# Patient Record
Sex: FEMALE | Race: WHITE | URBAN - METROPOLITAN AREA
[De-identification: names, ages, dates, MRNs, and addresses within clinical notes are randomized per-mention and may not be internally consistent; named-entity substitution may affect disease eponyms.]

---

## 2023-03-15 ENCOUNTER — INPATIENT (INPATIENT)
Facility: HOSPITAL | Age: 58
LOS: 0 days | Discharge: HOME | DRG: 149 | End: 2023-03-16
Attending: STUDENT IN AN ORGANIZED HEALTH CARE EDUCATION/TRAINING PROGRAM | Admitting: FAMILY MEDICINE
Payer: COMMERCIAL

## 2023-03-15 VITALS — HEIGHT: 62 IN | WEIGHT: 141.1 LBS

## 2023-03-15 DIAGNOSIS — Z98.890 OTHER SPECIFIED POSTPROCEDURAL STATES: Chronic | ICD-10-CM

## 2023-03-15 DIAGNOSIS — Z90.49 ACQUIRED ABSENCE OF OTHER SPECIFIED PARTS OF DIGESTIVE TRACT: Chronic | ICD-10-CM

## 2023-03-15 DIAGNOSIS — Z98.891 HISTORY OF UTERINE SCAR FROM PREVIOUS SURGERY: Chronic | ICD-10-CM

## 2023-03-15 DIAGNOSIS — R42 DIZZINESS AND GIDDINESS: ICD-10-CM

## 2023-03-15 LAB
ALBUMIN SERPL ELPH-MCNC: 4.3 G/DL — SIGNIFICANT CHANGE UP (ref 3.3–5)
ALP SERPL-CCNC: 49 U/L — SIGNIFICANT CHANGE UP (ref 40–120)
ALT FLD-CCNC: 22 U/L — SIGNIFICANT CHANGE UP (ref 12–78)
ANION GAP SERPL CALC-SCNC: 4 MMOL/L — LOW (ref 5–17)
APPEARANCE UR: CLEAR — SIGNIFICANT CHANGE UP
APTT BLD: 23.6 SEC — LOW (ref 27.5–35.5)
AST SERPL-CCNC: 19 U/L — SIGNIFICANT CHANGE UP (ref 15–37)
BACTERIA # UR AUTO: NEGATIVE — SIGNIFICANT CHANGE UP
BASOPHILS # BLD AUTO: 0.05 K/UL — SIGNIFICANT CHANGE UP (ref 0–0.2)
BASOPHILS NFR BLD AUTO: 0.5 % — SIGNIFICANT CHANGE UP (ref 0–2)
BILIRUB SERPL-MCNC: 0.4 MG/DL — SIGNIFICANT CHANGE UP (ref 0.2–1.2)
BILIRUB UR-MCNC: NEGATIVE — SIGNIFICANT CHANGE UP
BUN SERPL-MCNC: 9 MG/DL — SIGNIFICANT CHANGE UP (ref 7–23)
CALCIUM SERPL-MCNC: 9.4 MG/DL — SIGNIFICANT CHANGE UP (ref 8.5–10.1)
CHLORIDE SERPL-SCNC: 107 MMOL/L — SIGNIFICANT CHANGE UP (ref 96–108)
CO2 SERPL-SCNC: 28 MMOL/L — SIGNIFICANT CHANGE UP (ref 22–31)
COLOR SPEC: YELLOW — SIGNIFICANT CHANGE UP
CREAT SERPL-MCNC: 0.7 MG/DL — SIGNIFICANT CHANGE UP (ref 0.5–1.3)
DIFF PNL FLD: ABNORMAL
EGFR: 101 ML/MIN/1.73M2 — SIGNIFICANT CHANGE UP
EOSINOPHIL # BLD AUTO: 0.32 K/UL — SIGNIFICANT CHANGE UP (ref 0–0.5)
EOSINOPHIL NFR BLD AUTO: 3.3 % — SIGNIFICANT CHANGE UP (ref 0–6)
EPI CELLS # UR: SIGNIFICANT CHANGE UP
FLUAV AG NPH QL: SIGNIFICANT CHANGE UP
FLUBV AG NPH QL: SIGNIFICANT CHANGE UP
GLUCOSE SERPL-MCNC: 100 MG/DL — HIGH (ref 70–99)
GLUCOSE UR QL: NEGATIVE — SIGNIFICANT CHANGE UP
HCT VFR BLD CALC: 39.7 % — SIGNIFICANT CHANGE UP (ref 34.5–45)
HGB BLD-MCNC: 13 G/DL — SIGNIFICANT CHANGE UP (ref 11.5–15.5)
IMM GRANULOCYTES NFR BLD AUTO: 0.3 % — SIGNIFICANT CHANGE UP (ref 0–0.9)
INR BLD: 1.15 RATIO — SIGNIFICANT CHANGE UP (ref 0.88–1.16)
KETONES UR-MCNC: NEGATIVE — SIGNIFICANT CHANGE UP
LEUKOCYTE ESTERASE UR-ACNC: ABNORMAL
LYMPHOCYTES # BLD AUTO: 2.71 K/UL — SIGNIFICANT CHANGE UP (ref 1–3.3)
LYMPHOCYTES # BLD AUTO: 27.9 % — SIGNIFICANT CHANGE UP (ref 13–44)
MCHC RBC-ENTMCNC: 28.3 PG — SIGNIFICANT CHANGE UP (ref 27–34)
MCHC RBC-ENTMCNC: 32.7 GM/DL — SIGNIFICANT CHANGE UP (ref 32–36)
MCV RBC AUTO: 86.5 FL — SIGNIFICANT CHANGE UP (ref 80–100)
MONOCYTES # BLD AUTO: 0.57 K/UL — SIGNIFICANT CHANGE UP (ref 0–0.9)
MONOCYTES NFR BLD AUTO: 5.9 % — SIGNIFICANT CHANGE UP (ref 2–14)
NEUTROPHILS # BLD AUTO: 6.03 K/UL — SIGNIFICANT CHANGE UP (ref 1.8–7.4)
NEUTROPHILS NFR BLD AUTO: 62.1 % — SIGNIFICANT CHANGE UP (ref 43–77)
NITRITE UR-MCNC: NEGATIVE — SIGNIFICANT CHANGE UP
PH UR: 8 — SIGNIFICANT CHANGE UP (ref 5–8)
PLATELET # BLD AUTO: 367 K/UL — SIGNIFICANT CHANGE UP (ref 150–400)
POTASSIUM SERPL-MCNC: 4.2 MMOL/L — SIGNIFICANT CHANGE UP (ref 3.5–5.3)
POTASSIUM SERPL-SCNC: 4.2 MMOL/L — SIGNIFICANT CHANGE UP (ref 3.5–5.3)
PROT SERPL-MCNC: 8 GM/DL — SIGNIFICANT CHANGE UP (ref 6–8.3)
PROT UR-MCNC: NEGATIVE — SIGNIFICANT CHANGE UP
PROTHROM AB SERPL-ACNC: 13.4 SEC — SIGNIFICANT CHANGE UP (ref 10.5–13.4)
RBC # BLD: 4.59 M/UL — SIGNIFICANT CHANGE UP (ref 3.8–5.2)
RBC # FLD: 12.5 % — SIGNIFICANT CHANGE UP (ref 10.3–14.5)
RBC CASTS # UR COMP ASSIST: SIGNIFICANT CHANGE UP /HPF (ref 0–4)
RSV RNA NPH QL NAA+NON-PROBE: SIGNIFICANT CHANGE UP
SARS-COV-2 RNA SPEC QL NAA+PROBE: SIGNIFICANT CHANGE UP
SODIUM SERPL-SCNC: 139 MMOL/L — SIGNIFICANT CHANGE UP (ref 135–145)
SP GR SPEC: 1.01 — SIGNIFICANT CHANGE UP (ref 1.01–1.02)
TROPONIN I, HIGH SENSITIVITY RESULT: 4.31 NG/L — SIGNIFICANT CHANGE UP
UROBILINOGEN FLD QL: NEGATIVE — SIGNIFICANT CHANGE UP
WBC # BLD: 9.71 K/UL — SIGNIFICANT CHANGE UP (ref 3.8–10.5)
WBC # FLD AUTO: 9.71 K/UL — SIGNIFICANT CHANGE UP (ref 3.8–10.5)
WBC UR QL: SIGNIFICANT CHANGE UP /HPF (ref 0–5)

## 2023-03-15 PROCEDURE — 83735 ASSAY OF MAGNESIUM: CPT

## 2023-03-15 PROCEDURE — 36415 COLL VENOUS BLD VENIPUNCTURE: CPT

## 2023-03-15 PROCEDURE — 70553 MRI BRAIN STEM W/O & W/DYE: CPT

## 2023-03-15 PROCEDURE — 82607 VITAMIN B-12: CPT

## 2023-03-15 PROCEDURE — 84443 ASSAY THYROID STIM HORMONE: CPT

## 2023-03-15 PROCEDURE — 83036 HEMOGLOBIN GLYCOSYLATED A1C: CPT

## 2023-03-15 PROCEDURE — 93010 ELECTROCARDIOGRAM REPORT: CPT

## 2023-03-15 PROCEDURE — A9579: CPT

## 2023-03-15 PROCEDURE — 70496 CT ANGIOGRAPHY HEAD: CPT | Mod: 26,MA

## 2023-03-15 PROCEDURE — 70498 CT ANGIOGRAPHY NECK: CPT | Mod: 26,MA

## 2023-03-15 PROCEDURE — 80061 LIPID PANEL: CPT

## 2023-03-15 PROCEDURE — 70551 MRI BRAIN STEM W/O DYE: CPT

## 2023-03-15 PROCEDURE — 99285 EMERGENCY DEPT VISIT HI MDM: CPT

## 2023-03-15 PROCEDURE — 85025 COMPLETE CBC W/AUTO DIFF WBC: CPT

## 2023-03-15 PROCEDURE — 84100 ASSAY OF PHOSPHORUS: CPT

## 2023-03-15 PROCEDURE — 99222 1ST HOSP IP/OBS MODERATE 55: CPT

## 2023-03-15 PROCEDURE — 82746 ASSAY OF FOLIC ACID SERUM: CPT

## 2023-03-15 PROCEDURE — G0378: CPT

## 2023-03-15 PROCEDURE — 80053 COMPREHEN METABOLIC PANEL: CPT

## 2023-03-15 PROCEDURE — 99223 1ST HOSP IP/OBS HIGH 75: CPT

## 2023-03-15 RX ORDER — ACETAMINOPHEN 500 MG
650 TABLET ORAL EVERY 6 HOURS
Refills: 0 | Status: DISCONTINUED | OUTPATIENT
Start: 2023-03-15 | End: 2023-03-16

## 2023-03-15 RX ORDER — MECLIZINE HCL 12.5 MG
12.5 TABLET ORAL ONCE
Refills: 0 | Status: COMPLETED | OUTPATIENT
Start: 2023-03-15 | End: 2023-03-15

## 2023-03-15 RX ORDER — MECLIZINE HCL 12.5 MG
25 TABLET ORAL EVERY 6 HOURS
Refills: 0 | Status: DISCONTINUED | OUTPATIENT
Start: 2023-03-15 | End: 2023-03-16

## 2023-03-15 RX ORDER — PREGABALIN 225 MG/1
1 CAPSULE ORAL
Qty: 0 | Refills: 0 | DISCHARGE

## 2023-03-15 RX ORDER — SODIUM CHLORIDE 9 MG/ML
1000 INJECTION INTRAMUSCULAR; INTRAVENOUS; SUBCUTANEOUS ONCE
Refills: 0 | Status: COMPLETED | OUTPATIENT
Start: 2023-03-15 | End: 2023-03-15

## 2023-03-15 RX ORDER — ATORVASTATIN CALCIUM 80 MG/1
20 TABLET, FILM COATED ORAL AT BEDTIME
Refills: 0 | Status: DISCONTINUED | OUTPATIENT
Start: 2023-03-15 | End: 2023-03-16

## 2023-03-15 RX ORDER — MECLIZINE HCL 12.5 MG
12.5 TABLET ORAL EVERY 6 HOURS
Refills: 0 | Status: DISCONTINUED | OUTPATIENT
Start: 2023-03-15 | End: 2023-03-15

## 2023-03-15 RX ORDER — LANOLIN ALCOHOL/MO/W.PET/CERES
3 CREAM (GRAM) TOPICAL AT BEDTIME
Refills: 0 | Status: DISCONTINUED | OUTPATIENT
Start: 2023-03-15 | End: 2023-03-16

## 2023-03-15 RX ORDER — SODIUM CHLORIDE 9 MG/ML
1000 INJECTION INTRAMUSCULAR; INTRAVENOUS; SUBCUTANEOUS
Refills: 0 | Status: DISCONTINUED | OUTPATIENT
Start: 2023-03-15 | End: 2023-03-16

## 2023-03-15 RX ORDER — PREGABALIN 225 MG/1
1000 CAPSULE ORAL DAILY
Refills: 0 | Status: DISCONTINUED | OUTPATIENT
Start: 2023-03-15 | End: 2023-03-16

## 2023-03-15 RX ORDER — ONDANSETRON 8 MG/1
4 TABLET, FILM COATED ORAL ONCE
Refills: 0 | Status: COMPLETED | OUTPATIENT
Start: 2023-03-15 | End: 2023-03-15

## 2023-03-15 RX ORDER — ONDANSETRON 8 MG/1
4 TABLET, FILM COATED ORAL EVERY 6 HOURS
Refills: 0 | Status: DISCONTINUED | OUTPATIENT
Start: 2023-03-15 | End: 2023-03-16

## 2023-03-15 RX ORDER — ROSUVASTATIN CALCIUM 5 MG/1
1 TABLET ORAL
Qty: 0 | Refills: 0 | DISCHARGE

## 2023-03-15 RX ADMIN — SODIUM CHLORIDE 1000 MILLILITER(S): 9 INJECTION INTRAMUSCULAR; INTRAVENOUS; SUBCUTANEOUS at 16:17

## 2023-03-15 RX ADMIN — SODIUM CHLORIDE 1000 MILLILITER(S): 9 INJECTION INTRAMUSCULAR; INTRAVENOUS; SUBCUTANEOUS at 17:17

## 2023-03-15 RX ADMIN — ONDANSETRON 4 MILLIGRAM(S): 8 TABLET, FILM COATED ORAL at 16:17

## 2023-03-15 RX ADMIN — Medication 12.5 MILLIGRAM(S): at 16:17

## 2023-03-15 NOTE — ED STATDOCS - ATTENDING APP SHARED VISIT CONTRIBUTION OF CARE
I, Emili Gongora MD,  performed the initial face to face bedside interview with this patient regarding history of present illness, review of symptoms and relevant past medical, social and family history.  I completed an independent physical examination.  I was the initial provider who evaluated this patient.   I personally saw the patient and performed a substantive portion of the visit including all aspects of the medical decision making.  I have signed out the follow up of any pending tests (i.e. labs, radiological studies) to the EVA.  I have communicated the patient’s plan of care and disposition with the EVA.  The history, relevant review of systems, past medical and surgical history, medical decision making, and physical examination was documented by the scribe in my presence and I attest to the accuracy of the documentation.

## 2023-03-15 NOTE — H&P ADULT - NSHPSOCIALHISTORY_GEN_ALL_CORE
No smoking, or drug use. Social drinker, last drink was weeks ago. Retired, used to work in marketing. Lives w , does not require assistive walking devices.

## 2023-03-15 NOTE — PATIENT PROFILE ADULT - FALL HARM RISK - UNIVERSAL INTERVENTIONS
Bed in lowest position, wheels locked, appropriate side rails in place/Call bell, personal items and telephone in reach/Instruct patient to call for assistance before getting out of bed or chair/Non-slip footwear when patient is out of bed/[place] to call system/Physically safe environment - no spills, clutter or unnecessary equipment/Purposeful Proactive Rounding/Room/bathroom lighting operational, light cord in reach

## 2023-03-15 NOTE — H&P ADULT - NSICDXFAMILYHX_GEN_ALL_CORE_FT
FAMILY HISTORY:  Father  Still living? No  FH: myocardial infarction, Age at diagnosis: Age Unknown    Sibling  Still living? Unknown  Family history of CVA, Age at diagnosis: Age Unknown

## 2023-03-15 NOTE — ED STATDOCS - CPE ED RESP NORM
69 y/o F w/ PMH of nonconvulsive status epilepticus, s/p brain biopsy w/ beta-amyloid, Afib, HTN, Hypothyroidism, COPD, diabetes who presents with a complaint of 1d L sided severe slow onset headache.     VS: afebrile, others   Gen: Well appearing in NAD  Head: NC/AT  HEENT: moist mucous membranes  eye: PERRL 3mm b/l  Neck: trachea midline  Resp:  No distress  Ext: no deformities  Neuro:  A&Ox3, neck not stiff.  cn2-12 intact  Skin:  Warm and dry as visualized  Psych:  Normal affect and mood    MDM: complex neuro hx now w/1d L sided HA, vomited.  neuro exam nonfocal at this time.  no meningitis on exam.  doesn't fit typical pattern of SAH  CT head non-con, keppra level, labs, neuro consult, CBC, CMP, tylenol for pain
normal...

## 2023-03-15 NOTE — ED STATDOCS - PROGRESS NOTE DETAILS
pt aware of her cta and lab results and agrees to admission for MRI brain. spoke to Lg Santos who recommends MRI brain and she will consult. pt agrees with plan. -Alicia Tomlinson PA-C

## 2023-03-15 NOTE — ED ADULT NURSE NOTE - OBJECTIVE STATEMENT
Pt. is a 57YOF c/o vomiting and dizziness. Patient reports symptoms started yesterday at 2300. Patient called doctor who stated come to the ER. Pt. reports feeling like she is spinning when sitting still. Denies fall/trauma

## 2023-03-15 NOTE — ED STATDOCS - OBJECTIVE STATEMENT
56 y/o female with a PMHx of b/l tinnitus presents to the ED c/o dizziness since yesterday. Pt reports a room spinning sensation. Dizziness worse with head movement. +n/v. Pt tried Epley maneuver at home without relief. Pt recently completed Amoxicillin for a sinus infection. No other complaints at this time.

## 2023-03-15 NOTE — CONSULT NOTE ADULT - ASSESSMENT
57 F with PMHx of HLD, presented to ED today at 13.20 hrs with c/o vomiting and dizziness. Patient reports last evening she was lying on the recliner, around 2300 she experienced severe dizziness, was unable to stand and walk, vomited, went to sleep, upon waking up in the morning dizziness recurred, she felt her body was moving, worsened with postural changes, was wobbly, had to hold on lest she would fall, also vomited. Further on admits that her symptoms had stated a day earlier but were mild, also had vertigo symptoms since Fall 2022.  Patient has chronic tinnitus and recently was treated for sinusitis.    CTH, CT  angio head / neck reveals no LVO, stenosis, infarct or hmg    # Most likely positional vertigo    - IV hydration  - PT  - MRI brain  - Pt educated about postural changes    Above D/W pt and Dr. Gongora

## 2023-03-15 NOTE — PHARMACOTHERAPY INTERVENTION NOTE - COMMENTS
Medication reconciliation completed.  Reviewed Medication list and confirmed med allergies with patient; confirmed with Dr. Cho MedHx.  Pt confirms that she stopped taking the ABX at home with 1-2 tabs remaining, stating that she "felt like it wasn't helping... it went away on its own."

## 2023-03-15 NOTE — H&P ADULT - ATTENDING COMMENTS
Patient is seen and examined with the Resident. Agree with above assessment and plan. Patient presented with dizziness. most likely positional vertigo. On meclizine an Zofran as needed. F/u MRI report.

## 2023-03-15 NOTE — CONSULT NOTE ADULT - SUBJECTIVE AND OBJECTIVE BOX
CC: 57 y old  Female who presents with a chief complaint of dizziness    HPI:  57 F with PMHx of HLD, presented to ED today at 13.20 hrs with c/o vomiting and dizziness. Patient reports last evening she was lying on the recliner, around 2300 she experienced severe dizziness, was unable to stand and walk, vomited, she then went to sleep, upon waking up in the morning dizziness recurred, she felt her body was moving, her sx were worse with postural changes, she was wobbly, had to hold on lest she would fall, she also vomited. Patient called her doctor who stated she come to the ER.     Pt reports that her symptoms had stated a day earlier but were mild, also admits to having vertigo symptoms since the Fall of 2022.    Patient denies double vision, visual blurring, focal weakness, numbness/tingling, she has chronic tinnitus and recently was treated for sinusitis.      PAST MEDICAL & SURGICAL HISTORY:  Hyperlipidemia      FAMILY HISTORY:  No relevant history      Social Hx:  Nonsmoker, no drug or alcohol use      MEDICATIONS  (STANDING):  Rosuvastatin  sodium chloride 0.9%. 1000 milliLiter(s) (125 mL/Hr) IV Continuous <Continuous>       Allergies  No Known Allergies      ROS: Pertinent positives in HPI, all other ROS were reviewed and are negative.        Vital Signs Last 24 Hrs  T(C): 36.5 (15 Mar 2023 13:33), Max: 36.5 (15 Mar 2023 13:33)  T(F): 97.7 (15 Mar 2023 13:33), Max: 97.7 (15 Mar 2023 13:33)  HR: 69 (15 Mar 2023 13:33) (69 - 69)  BP: 151/90 (15 Mar 2023 13:33) (151/90 - 151/90)  BP(mean): 109 (15 Mar 2023 13:33) (109 - 109)  RR: 18 (15 Mar 2023 13:33) (18 - 18)  SpO2: 99% (15 Mar 2023 13:33) (99% - 99%)    Parameters below as of 15 Mar 2023 13:33  Patient On (Oxygen Delivery Method): room air      Gen exam;  Normocephalic, in no distress, awake and alert.  HEENT: PERRLA, EOMI, No nystagmus, no diplopia  Neck: Supple.  Respiratory: Breath sounds are clear bilaterally  Cardiovascular: S1 and S2, regular  Extremities:  no edema  Vascular: Caritid Bruit - no  Musculoskeletal: no abnormal movements  Skin: No rashes      Neurological exam:  HF: A x O x 3. Interactive, normal affect. Speech fluent, No Aphasia or paraphasic errors. Naming /repetition intact   CN: KATELYNN, EOMI, VFF, facial sensation normal, no NLFD, tongue midline, Palate moves equally, SCM equal bilaterally  Motor: No pronator drift, Strength 5/5 in all 4 ext, normal bulk and tone, no tremor, rigidity.    Sens: Intact to light touch     Reflexes: Symmetric and normal. downgoing toes b/l  Coord:  No FNFA, dysmetria, MARIA ALEJANDRA intact   Gait/Balance: Not tested    NIHSS: 0      Labs:   03-15    139  |  107  |  9   ----------------------------<  100<H>  4.2   |  28  |  0.70    Ca    9.4      15 Mar 2023 15:00    TPro  8.0  /  Alb  4.3  /  TBili  0.4  /  DBili  x   /  AST  19  /  ALT  22  /  AlkPhos  49  03-15                          13.0   9.71  )-----------( 367      ( 15 Mar 2023 15:00 )             39.7       Radiology:  -< from: CT Angio Neck w/ IV Cont (03.15.23 @ 15:43) >  NONCONTRAST CT HEAD:  There is periventricular and subcortical white matter hypodensity without   mass effect, nonspecific, likely representing minimal chronic   microvascular ischemic changes. There is no compelling evidence for an   acute transcortical infarction. There is no evidence of mass, mass   effect, midline shift or extra-axial fluid collection. The lateral   ventricles and cortical sulci are age-appropriate in size and   configuration. Trace mucosal thickening in the left ethmoid sinus. The   orbits, mastoid air cells and visualized paranasal sinuses are normal.   The calvarium is intact.      CTA BRAIN:  The [place] of [place] and vertebrobasilar system are unremarkable without   evidence of stenosis, occlusion or saccular aneurysm dilation. No   evidence for arterial venous malformation.      CTA NECK:  Mild bilateral carotid bulb calcifications without significant luminal   narrowing. Left vertebral artery is dominant. A left-sided aortic arch is   demonstrated. There is normal relationship to the great vessels. The   common carotid arteries, internal carotid arteries and vertebral arteries   are normal in caliber. No evidence of stenosis, occlusion or saccular   aneurysm dilation.    IMPRESSION:    No acute infarction or intracranial hemorrhage. No large vessel   occlusion. Consider MRI of the brain as clinically warranted.

## 2023-03-15 NOTE — H&P ADULT - ASSESSMENT
56 yo F w PMH of HLD presenting to  ED for further evaluation of sudden onset dizziness.    #Positional vertigo   -Suspect Vestibular neuritis given recent URI and relief w antiemetics   -Pooja-Hallpike worse on right, no improvement w Epley. HITS negative for nystagmus   -Continue w Meclizine and Zofran    -Continue w gentle fluid resuscitation   -Neuro evaluated pt in ER, recommended MRI and PT   -F/U AM MRI   -Obtain orthostatic vitals   -F/U AM TSH, A1C, B12   -PT consult    #HLD   -Rosuvastatin therapeutic exchange for Atorvastatin 20 mg   -F/U AM Lipid panel     #Diet   -DASH     #DVT PPX   -SCD’s      #Advanced Directives   -FULL CODE     *Above discussed w Dr. Lee

## 2023-03-15 NOTE — H&P ADULT - NSHPPHYSICALEXAM_GEN_ALL_CORE
Vitals  T(F): 97.9 (03-15-23 @ 19:59), Max: 97.9 (03-15-23 @ 19:59)  HR: 63 (03-15-23 @ 19:59) (63 - 69)  BP: 121/77 (03-15-23 @ 19:59) (121/77 - 151/90)  RR: 16 (03-15-23 @ 19:59) (16 - 18)  SpO2: 100% (03-15-23 @ 19:59) (99% - 100%)    Physical Exam   Gen: NAD, comfortable  HENT: atraumatic head and ears, no gross abnormalities of ears, mucous membranes moist, no oral lesions, neck supple without masses/goiter/lymphadenopathy  CV: RRR, nl s1/s2, no M/R/G  Pulm: nl respiratory effort, CTAB, no wheezes/crackles/rhonchi  Back: no scoliosis, lordosis, or kyphosis, no tenderness  Abd: normoactive bowel sounds in all 4 quadrants, soft, nontender, nondistended, no rebound, no guarding, no masses  Extremities: no pedal edema, pedal pulses palpable   Skin: nl warm and dry, no wounds   Neuro: HITS negative, Onyx-Hallpike positive on right, no improvement w Epley maneuver. A&Ox3, answering questions appropriately, PERRL, EOMI, face symmetric, sensation equal bilaterally in face, tongue midline, 5/5 strength in upper and lower extremities bilaterally, sensation intact in upper and lower extremities bilaterally, nl finger to nose, and nl heel to shin   Pysch: no depression, no SI, no HI

## 2023-03-15 NOTE — ED ADULT TRIAGE NOTE - CHIEF COMPLAINT QUOTE
Patient presents to the ER with complaints of vomiting, dizziness and "vertigo symptoms since the Fall of 2022." Patient reports symptoms started yesterday at 2300. Patient called doctor who stated come to the ER. Patient states when she lays down, she feels the room spinning but is resolved when she gets up in the am. Patient reports tinnitus. Patient denies vision changes, weakness, trouble swallowing, numbness/tingling. Recently completed antibiotics for sinus infection.

## 2023-03-16 ENCOUNTER — TRANSCRIPTION ENCOUNTER (OUTPATIENT)
Age: 58
End: 2023-03-16

## 2023-03-16 VITALS
RESPIRATION RATE: 18 BRPM | DIASTOLIC BLOOD PRESSURE: 67 MMHG | OXYGEN SATURATION: 99 % | SYSTOLIC BLOOD PRESSURE: 118 MMHG | HEART RATE: 73 BPM | TEMPERATURE: 98 F

## 2023-03-16 LAB
A1C WITH ESTIMATED AVERAGE GLUCOSE RESULT: 5.8 % — HIGH (ref 4–5.6)
ALBUMIN SERPL ELPH-MCNC: 3.4 G/DL — SIGNIFICANT CHANGE UP (ref 3.3–5)
ALP SERPL-CCNC: 40 U/L — SIGNIFICANT CHANGE UP (ref 40–120)
ALT FLD-CCNC: 21 U/L — SIGNIFICANT CHANGE UP (ref 12–78)
ANION GAP SERPL CALC-SCNC: 6 MMOL/L — SIGNIFICANT CHANGE UP (ref 5–17)
AST SERPL-CCNC: 13 U/L — LOW (ref 15–37)
BASOPHILS # BLD AUTO: 0.05 K/UL — SIGNIFICANT CHANGE UP (ref 0–0.2)
BASOPHILS NFR BLD AUTO: 0.8 % — SIGNIFICANT CHANGE UP (ref 0–2)
BILIRUB SERPL-MCNC: 0.3 MG/DL — SIGNIFICANT CHANGE UP (ref 0.2–1.2)
BUN SERPL-MCNC: 10 MG/DL — SIGNIFICANT CHANGE UP (ref 7–23)
CALCIUM SERPL-MCNC: 8.5 MG/DL — SIGNIFICANT CHANGE UP (ref 8.5–10.1)
CHLORIDE SERPL-SCNC: 114 MMOL/L — HIGH (ref 96–108)
CHOLEST SERPL-MCNC: 128 MG/DL — SIGNIFICANT CHANGE UP
CO2 SERPL-SCNC: 26 MMOL/L — SIGNIFICANT CHANGE UP (ref 22–31)
CREAT SERPL-MCNC: 0.72 MG/DL — SIGNIFICANT CHANGE UP (ref 0.5–1.3)
EGFR: 98 ML/MIN/1.73M2 — SIGNIFICANT CHANGE UP
EOSINOPHIL # BLD AUTO: 0.49 K/UL — SIGNIFICANT CHANGE UP (ref 0–0.5)
EOSINOPHIL NFR BLD AUTO: 8.3 % — HIGH (ref 0–6)
ESTIMATED AVERAGE GLUCOSE: 120 MG/DL — HIGH (ref 68–114)
FOLATE SERPL-MCNC: >20 NG/ML — SIGNIFICANT CHANGE UP
GLUCOSE SERPL-MCNC: 93 MG/DL — SIGNIFICANT CHANGE UP (ref 70–99)
HCT VFR BLD CALC: 37.5 % — SIGNIFICANT CHANGE UP (ref 34.5–45)
HDLC SERPL-MCNC: 48 MG/DL — LOW
HGB BLD-MCNC: 12.3 G/DL — SIGNIFICANT CHANGE UP (ref 11.5–15.5)
IMM GRANULOCYTES NFR BLD AUTO: 0.2 % — SIGNIFICANT CHANGE UP (ref 0–0.9)
LIPID PNL WITH DIRECT LDL SERPL: 64 MG/DL — SIGNIFICANT CHANGE UP
LYMPHOCYTES # BLD AUTO: 2.56 K/UL — SIGNIFICANT CHANGE UP (ref 1–3.3)
LYMPHOCYTES # BLD AUTO: 43.2 % — SIGNIFICANT CHANGE UP (ref 13–44)
MAGNESIUM SERPL-MCNC: 2.2 MG/DL — SIGNIFICANT CHANGE UP (ref 1.6–2.6)
MCHC RBC-ENTMCNC: 28.5 PG — SIGNIFICANT CHANGE UP (ref 27–34)
MCHC RBC-ENTMCNC: 32.8 GM/DL — SIGNIFICANT CHANGE UP (ref 32–36)
MCV RBC AUTO: 87 FL — SIGNIFICANT CHANGE UP (ref 80–100)
MONOCYTES # BLD AUTO: 0.46 K/UL — SIGNIFICANT CHANGE UP (ref 0–0.9)
MONOCYTES NFR BLD AUTO: 7.8 % — SIGNIFICANT CHANGE UP (ref 2–14)
NEUTROPHILS # BLD AUTO: 2.36 K/UL — SIGNIFICANT CHANGE UP (ref 1.8–7.4)
NEUTROPHILS NFR BLD AUTO: 39.7 % — LOW (ref 43–77)
NON HDL CHOLESTEROL: 79 MG/DL — SIGNIFICANT CHANGE UP
PHOSPHATE SERPL-MCNC: 3.9 MG/DL — SIGNIFICANT CHANGE UP (ref 2.5–4.5)
PLATELET # BLD AUTO: 324 K/UL — SIGNIFICANT CHANGE UP (ref 150–400)
POTASSIUM SERPL-MCNC: 3.8 MMOL/L — SIGNIFICANT CHANGE UP (ref 3.5–5.3)
POTASSIUM SERPL-SCNC: 3.8 MMOL/L — SIGNIFICANT CHANGE UP (ref 3.5–5.3)
PROT SERPL-MCNC: 6.7 GM/DL — SIGNIFICANT CHANGE UP (ref 6–8.3)
RBC # BLD: 4.31 M/UL — SIGNIFICANT CHANGE UP (ref 3.8–5.2)
RBC # FLD: 12.6 % — SIGNIFICANT CHANGE UP (ref 10.3–14.5)
SODIUM SERPL-SCNC: 146 MMOL/L — HIGH (ref 135–145)
TRIGL SERPL-MCNC: 76 MG/DL — SIGNIFICANT CHANGE UP
TSH SERPL-MCNC: 3.84 UU/ML — SIGNIFICANT CHANGE UP (ref 0.34–4.82)
VIT B12 SERPL-MCNC: 920 PG/ML — SIGNIFICANT CHANGE UP (ref 232–1245)
WBC # BLD: 5.93 K/UL — SIGNIFICANT CHANGE UP (ref 3.8–10.5)
WBC # FLD AUTO: 5.93 K/UL — SIGNIFICANT CHANGE UP (ref 3.8–10.5)

## 2023-03-16 PROCEDURE — 70553 MRI BRAIN STEM W/O & W/DYE: CPT | Mod: 26

## 2023-03-16 PROCEDURE — 99232 SBSQ HOSP IP/OBS MODERATE 35: CPT

## 2023-03-16 PROCEDURE — 99239 HOSP IP/OBS DSCHRG MGMT >30: CPT | Mod: GC

## 2023-03-16 RX ORDER — ONDANSETRON 8 MG/1
1 TABLET, FILM COATED ORAL
Qty: 30 | Refills: 0
Start: 2023-03-16 | End: 2023-03-25

## 2023-03-16 RX ORDER — MECLIZINE HCL 12.5 MG
1 TABLET ORAL
Qty: 40 | Refills: 0
Start: 2023-03-16 | End: 2023-03-25

## 2023-03-16 RX ADMIN — Medication 650 MILLIGRAM(S): at 07:14

## 2023-03-16 RX ADMIN — Medication 1 TABLET(S): at 09:08

## 2023-03-16 RX ADMIN — Medication 650 MILLIGRAM(S): at 13:19

## 2023-03-16 RX ADMIN — PREGABALIN 1000 MICROGRAM(S): 225 CAPSULE ORAL at 09:08

## 2023-03-16 NOTE — PHYSICAL THERAPY INITIAL EVALUATION ADULT - GENERAL OBSERVATIONS, REHAB EVAL
sitting up in bed feeding self lunch , awake,alert, Ox4, reports significant improvement in dizziness, now able to turn head L/R and look down /up without provoking symptoms including motion illness/vomiting

## 2023-03-16 NOTE — DISCHARGE NOTE PROVIDER - CARE PROVIDER_API CALL
Fabiola Sandy J  CARDIOVASCULAR DISEASE  [address] [place] [address]  Phone: [phone]  Fax: [phone]  Follow Up Time: 2 weeks

## 2023-03-16 NOTE — PROGRESS NOTE ADULT - ASSESSMENT
57 F with PMHx of HLD, presented to ED today at 13.20 hrs with c/o vomiting and dizziness. Patient reports last evening she was lying on the recliner, around 2300 she experienced severe dizziness, was unable to stand and walk, vomited, went to sleep, upon waking up in the morning dizziness recurred, she felt her body was moving, worsened with postural changes, was wobbly, had to hold on lest she would fall, also vomited. Further on admits that her symptoms had stated a day earlier but were mild, also had vertigo symptoms since Fall 2022.  Patient has chronic tinnitus and recently was treated for sinusitis.    CTH, CT  angio head / neck reveals no LVO, stenosis, infarct or hmg    # Most likely positional vertigo, improving    - PT eval  - MRI brain  - Pt educated about postural changes    Above D/W pt and Dr. Glez and FP team, if MRI brain negative, can f/u with Vestibular therapy as OP  Call neuro if needed henceforth

## 2023-03-16 NOTE — DISCHARGE NOTE NURSING/CASE MANAGEMENT/SOCIAL WORK - PATIENT PORTAL LINK FT
You can access the FollowMyHealth Patient Portal offered by [facility] by registering at the following website: [email or web address]. By joining [facility]’s FollowMyHealth portal, you will also be able to view your health information using other applications (apps) compatible with our system.

## 2023-03-16 NOTE — PHYSICAL THERAPY INITIAL EVALUATION ADULT - PATIENT PROFILE REVIEW, REHAB EVAL
yes returned from MRI,results PENDING (Addendum: brain and internal auditory canals WNL , + mucosal disease L maxillary sinus ,deviated septum L to R )/yes

## 2023-03-16 NOTE — PROGRESS NOTE ADULT - SUBJECTIVE AND OBJECTIVE BOX
Pt has noted 75% improvement of dizziness, was able to walk to bathroom. No other complaints    Awaiting MRI brain  Await PT     ROS: As above, other ROS Negative    MEDICATIONS  (STANDING):  atorvastatin 20 milliGRAM(s) Oral at bedtime  cyanocobalamin 1000 MICROGram(s) Oral daily  multivitamin 1 Tablet(s) Oral daily  sodium chloride 0.9%. 1000 milliLiter(s) (125 mL/Hr) IV Continuous <Continuous>  sodium chloride 0.9%. 1000 milliLiter(s) (100 mL/Hr) IV Continuous <Continuous>      Vital Signs Last 24 Hrs  T(C): 36.3 (16 Mar 2023 07:39), Max: 36.6 (15 Mar 2023 19:59)  T(F): 97.3 (16 Mar 2023 07:39), Max: 97.9 (15 Mar 2023 19:59)  HR: 68 (16 Mar 2023 07:39) (63 - 69)  BP: 113/75 (16 Mar 2023 07:39) (113/75 - 151/90)  BP(mean): 109 (15 Mar 2023 13:33) (109 - 109)  RR: 17 (16 Mar 2023 07:39) (16 - 18)  SpO2: 98% (16 Mar 2023 07:39) (98% - 100%)    Parameters below as of 16 Mar 2023 07:39  Patient On (Oxygen Delivery Method): room air    Neurological exam:  HF: A x O x 3. Interactive, normal affect. Speech fluent, No Aphasia or paraphasic errors. Naming /repetition intact   CN: KATELYNN, EOMI, VFF, facial sensation normal, no NLFD, tongue midline, Palate moves equally, SCM equal bilaterally  Motor: No pronator drift, Strength 5/5 in all 4 ext, normal bulk and tone, no tremor, rigidity.    Sens: Intact to light touch     Reflexes: Symmetric and normal. downgoing toes b/l  Coord:  No FNFA, dysmetria, MARIA ALEJANDRA intact   Gait/Balance: Not tested    NIHSS: 0                        12.3   5.93  )-----------( 324      ( 16 Mar 2023 06:40 )             37.5     03-16    146<H>  |  114<H>  |  10  ----------------------------<  93  3.8   |  26  |  0.72    Ca    8.5      16 Mar 2023 06:40  Phos  3.9     03-16  Mg     2.2     03-16    TPro  6.7  /  Alb  3.4  /  TBili  0.3  /  DBili  x   /  AST  13<L>  /  ALT  21  /  AlkPhos  40  03-16      03-16 Chol 128 LDL -- HDL 48<L> Trig 76    Radiology report:  < from: CT Angio Neck w/ IV Cont (03.15.23 @ 15:43) >  NONCONTRAST CT HEAD:  There is periventricular and subcortical white matter hypodensity without   mass effect, nonspecific, likely representing minimal chronic   microvascular ischemic changes. There is no compelling evidence for an   acute transcortical infarction. There is no evidence of mass, mass   effect, midline shift or extra-axial fluid collection. The lateral   ventricles and cortical sulci are age-appropriate in size and   configuration. Trace mucosal thickening in the left ethmoid sinus. The   orbits, mastoid air cells and visualized paranasal sinuses are normal.   The calvarium is intact.      CTA BRAIN:  The [place] of [place] and vertebrobasilar system are unremarkable without   evidence of stenosis, occlusion or saccular aneurysm dilation. No   evidence for arterial venous malformation.      CTA NECK:  Mild bilateral carotid bulb calcifications without significant luminal   narrowing. Left vertebral artery is dominant. A left-sided aortic arch is   demonstrated. There is normal relationship to the great vessels. The   common carotid arteries, internal carotid arteries and vertebral arteries   are normal in caliber. No evidence of stenosis, occlusion or saccular   aneurysm dilation.    IMPRESSION:    No acute infarction or intracranial hemorrhage. No large vessel   occlusion. Consider MRI of the brain as clinically lucio

## 2023-03-16 NOTE — DISCHARGE NOTE PROVIDER - NSDCMRMEDTOKEN_GEN_ALL_CORE_FT
Augmentin 875 mg-125 mg oral tablet: 1 tab(s) orally every 12 hours for 10 days  ***course not complete, pt has 1-2 tabs remaining***  meclizine 25 mg oral tablet: 1 tab(s) orally every 6 hours, As needed, Dizziness  Multiple Vitamins oral tablet: 1 tab(s) orally once a day  ondansetron 4 mg oral tablet: 1 tab(s) orally every 8 hours, As Needed for nausea  rosuvastatin 5 mg oral tablet: 1 tab(s) orally once a day (at bedtime)  Vitamin B-12 1000 mcg oral tablet: 1 tab(s) orally once a day

## 2023-03-16 NOTE — DISCHARGE NOTE PROVIDER - NSDCCPCAREPLAN_GEN_ALL_CORE_FT
PRINCIPAL DISCHARGE DIAGNOSIS  Diagnosis: Vestibular vertigo  Assessment and Plan of Treatment: You were admitted for evaluation of dizziness and vomiting that you have been having for the past week. You were given anti-emetic medication and your symptoms improved, although the dizziness has been a chronic issue for you. Labyrinthitis is an inflammatory or infectious condition that affects the labyrinth (the balance and hearing organs within the inner ear) and causes prolonged vertigo and hearing loss.   Vestibular neuritis is a condition that affects the vestibular nerve, which connects the inner ear to the brain, causing prolonged vertigo usually in the absence of significant hearing loss. The onset of both conditions is typically abrupt over minutes to hours, and may involve vertigo, imbalance, jumping vision due to eye movements, nausea and vomiting. You don’t report hearing loss but you report chronic tinnitis and vertigo for the past few months.    Vestibular rehabilitation is regarded as the primary treatment for vestibular neuritis. Studies have shown excellent results related to physical therapy treatment (Vestibular Rehabilitation Therapy – VRT). Physiotherapy identifies the specific problems related to the affected vestibular organ (as this is usually unique to each patient) then uses vestibular rehabilitation exercises to improve compensation gradually reducing the symptoms. With time and vestibular (balance) physical therapy, there is gradual improvement and the prognosis is generally good. Please follow up with your PCP for referral to an ENT to yamila work this up.       PRINCIPAL DISCHARGE DIAGNOSIS  Diagnosis: Vestibular vertigo  Assessment and Plan of Treatment: You were admitted for evaluation of dizziness and vomiting that you have been having for the past week. You were given anti-emetic medication and your symptoms improved, although the dizziness has been a chronic issue for you. Labyrinthitis is an inflammatory or infectious condition that affects the labyrinth (the balance and hearing organs within the inner ear) and causes prolonged vertigo and hearing loss.   Vestibular neuritis is a condition that affects the vestibular nerve, which connects the inner ear to the brain, causing prolonged vertigo usually in the absence of significant hearing loss. The onset of both conditions is typically abrupt over minutes to hours, and may involve vertigo, imbalance, jumping vision due to eye movements, nausea and vomiting. You don’t report significant hearing loss but you report chronic tinnitis and vertigo for the past few months.    Vestibular rehabilitation is regarded as the primary treatment for vestibular neuritis. Studies have shown excellent results related to physical therapy treatment (Vestibular Rehabilitation Therapy – VRT). Physiotherapy identifies the specific problems related to the affected vestibular organ (as this is usually unique to each patient) then uses vestibular rehabilitation exercises to improve compensation gradually reducing the symptoms. With time and vestibular (balance) physical therapy, there is gradual improvement and the prognosis is generally good. When needed, you can continue to take Zofran 4mg for nausea and meclizine 25mg for dizziness. Please follow up with your PCP for referral to an ENT to further work up your condition.

## 2023-03-16 NOTE — DISCHARGE NOTE PROVIDER - HOSPITAL COURSE
Patient is a 56 yo F w PMH of HLD and chronic tinnitus presenting to  ED on 3/15 for further evaluation of sudden onset dizziness associated with nausea and vomiting. Of not the patient did have a recent sinusitis infection for 3 weeks treated with antibiotics (but it was likely viral as the antibiotics didn’t really help). In the ED vitals and labs WNL. Neurology evaluated the pt in the ER and recommended admission for MRI. S/P 1L NS. She was given Zofran and Meclizine. CT Angio showed No acute infarction or intracranial hemorrhage or masses appreciated. She was admitted for possible vestibular neuritis given recent URI infection and relief with antiemetics. She received physical therapy, had negative orthostatic vitals. During her stay her symptoms improved. She had an MRI head which showed _______. Internal acoustic canal MRI was also performed due to patient having chronic tinnitus,  and dizziness, to evaluate for schwannoma, but so signs of schwannoma was found. Patient will be sent home with anti-emetic medication and will follow up with her PCP, and an ENT for further workup and therapy.        3/16: Patient was seen at bedside. She reports her nausea and dizziness has improved. No focal deficits on neuro exam. Hearing intact in both ears b/l. VSS. Patient is stable upon discharge.     Vital Signs Last 24 Hrs  T(C): 36.3 (16 Mar 2023 07:39), Max: 36.6 (15 Mar 2023 19:59)  T(F): 97.3 (16 Mar 2023 07:39), Max: 97.9 (15 Mar 2023 19:59)  HR: 68 (16 Mar 2023 07:39) (63 - 68)  BP: 113/75 (16 Mar 2023 07:39) (113/75 - 123/74)  RR: 17 (16 Mar 2023 07:39) (16 - 18)  SpO2: 98% (16 Mar 2023 07:39) (98% - 100%)    Parameters below as of 16 Mar 2023 07:39  Patient On (Oxygen Delivery Method): room air    GENERAL: A&Ox3, non-toxic appearing, no acute distress  HEENT: NCAT, EOMI, oral mucosa moist, pink conjunctiva  RESP: CTABL  CV: RRR, no murmurs/rubs/gallops  ABDOMEN: soft, non-tender, non-distended, no guarding, no CVA tenderness  MSK: no visible deformities  NEURO: no focal sensory or motor deficits, CN 2-12 grossly intact  SKIN: warm, normal color, well perfused, no rash  PSYCH: normal affect Patient is a 56 yo F w PMH of HLD and chronic tinnitus presenting to  ED on 3/15 for further evaluation of sudden onset dizziness associated with nausea and vomiting. Of not the patient did have a recent sinusitis infection for 3 weeks treated with antibiotics (but it was likely viral as the antibiotics didn’t really help). In the ED vitals and labs WNL. Neurology evaluated the pt in the ER and recommended admission for MRI. S/P 1L NS. She was given Zofran and Meclizine. CT Angio showed No acute infarction or intracranial hemorrhage or masses appreciated. She was admitted for possible vestibular neuritis given recent URI infection and relief with antiemetics. She received physical therapy, had negative orthostatic vitals. During her stay her symptoms improved. She had an MRI of her brain which was unremarkable for any pathology. Internal acoustic canal MRI was also performed due to patient having chronic tinnitus,  and dizziness, to evaluate for schwannoma, but so signs of schwannoma was found. Patient will be sent home with anti-emetic medication and will follow up with her PCP, and an ENT for further workup and therapy.        3/16: Patient was seen at bedside. She reports her nausea and dizziness has improved. No focal deficits on neuro exam. Hearing intact in both ears b/l. VSS. Patient is stable upon discharge.     Vital Signs Last 24 Hrs  T(C): 36.3 (16 Mar 2023 07:39), Max: 36.6 (15 Mar 2023 19:59)  T(F): 97.3 (16 Mar 2023 07:39), Max: 97.9 (15 Mar 2023 19:59)  HR: 68 (16 Mar 2023 07:39) (63 - 68)  BP: 113/75 (16 Mar 2023 07:39) (113/75 - 123/74)  RR: 17 (16 Mar 2023 07:39) (16 - 18)  SpO2: 98% (16 Mar 2023 07:39) (98% - 100%)    Parameters below as of 16 Mar 2023 07:39  Patient On (Oxygen Delivery Method): room air    GENERAL: A&Ox3, non-toxic appearing, no acute distress  HEENT: NCAT, EOMI, oral mucosa moist, pink conjunctiva  RESP: CTABL  CV: RRR, no murmurs/rubs/gallops  ABDOMEN: soft, non-tender, non-distended, no guarding, no CVA tenderness  MSK: no visible deformities  NEURO: no focal sensory or motor deficits, CN 2-12 grossly intact  SKIN: warm, normal color, well perfused, no rash  PSYCH: normal affect Patient is a 58 yo F w PMH of HLD and chronic tinnitus presenting to  ED on 3/15 for further evaluation of sudden onset dizziness associated with nausea and vomiting. Of not the patient did have a recent sinusitis infection for 3 weeks treated with antibiotics (but it was likely viral as the antibiotics didn’t really help). In the ED vitals and labs WNL. Neurology evaluated the pt in the ER and recommended admission for MRI. S/P 1L NS. She was given Zofran and Meclizine. CT Angio showed No acute infarction or intracranial hemorrhage or masses appreciated. She was admitted for possible vestibular neuritis given recent URI infection and relief with antiemetics. She received physical therapy, had negative orthostatic vitals. During her stay her symptoms improved. She had an MRI of her brain which was unremarkable for any pathology. Internal acoustic canal MRI was also performed due to patient having chronic tinnitus,  and dizziness, to evaluate for schwannoma, but so signs of schwannoma was found. Patient will be sent home with anti-emetic medication and will follow up with her PCP, and an ENT for further workup and therapy.        3/16: Patient was seen at bedside. She reports her nausea and dizziness has improved. No focal deficits on neuro exam. Hearing intact in both ears b/l. VSS. Patient is stable upon discharge.     Vital Signs Last 24 Hrs  T(C): 36.3 (16 Mar 2023 07:39), Max: 36.6 (15 Mar 2023 19:59)  T(F): 97.3 (16 Mar 2023 07:39), Max: 97.9 (15 Mar 2023 19:59)  HR: 68 (16 Mar 2023 07:39) (63 - 68)  BP: 113/75 (16 Mar 2023 07:39) (113/75 - 123/74)  RR: 17 (16 Mar 2023 07:39) (16 - 18)  SpO2: 98% (16 Mar 2023 07:39) (98% - 100%)    Parameters below as of 16 Mar 2023 07:39  Patient On (Oxygen Delivery Method): room air    GENERAL: A&Ox3, non-toxic appearing, no acute distress  HEENT: NCAT, EOMI, oral mucosa moist, pink conjunctiva  RESP: CTABL  CV: RRR, no murmurs/rubs/gallops  ABDOMEN: soft, non-tender, non-distended, no guarding, no CVA tenderness  MSK: no visible deformities  NEURO: no focal sensory or motor deficits, CN 2-12 grossly intact  SKIN: warm, normal color, well perfused, no rash  PSYCH: normal affect        medicine attending addendum- pleasant woman presented with severe vertigo raising concern for ischemic etiology. fortunately imaging did not reveal any evidence of stroke or other structural lesions as a cause. all likely BPPV but given h/o tinnitus advised to follow up with ENT. total time ~32 min

## 2023-03-16 NOTE — PHYSICAL THERAPY INITIAL EVALUATION ADULT - PERTINENT HX OF CURRENT PROBLEM, REHAB EVAL
severe dizziness with associated vomiting (non-bloody,non-bilious) relieved minimally with anti-emetics , performed Epley maneuver with no effect ; pt with chronic intermittent dizziness ,now acutely worsened , worse when lying flat and with head movements severe dizziness with associated vomiting (non-bloody, non-bilious) relieved minimally with anti-emetics , performed Epley maneuver with no effect ; pt with chronic intermittent dizziness ,now acutely worsened , worse when lying flat and with head movements

## 2023-03-16 NOTE — DISCHARGE NOTE NURSING/CASE MANAGEMENT/SOCIAL WORK - NSDCPEFALRISK_GEN_ALL_CORE
For information on Fall & Injury Prevention, visit: [email or web address] OR  [email or web address] OR  https://www.cdc.gov/steadi/patient.html

## 2023-03-16 NOTE — PHYSICAL THERAPY INITIAL EVALUATION ADULT - ACTIVE RANGE OF MOTION EXAMINATION, REHAB EVAL
jairo. upper extremity Active ROM was WNL (within normal limits)/bilateral lower extremity Active ROM was WNL (within normal limits)

## 2023-03-21 DIAGNOSIS — Z86.19 PERSONAL HISTORY OF OTHER INFECTIOUS AND PARASITIC DISEASES: ICD-10-CM

## 2023-03-21 DIAGNOSIS — H81.11 BENIGN PAROXYSMAL VERTIGO, RIGHT EAR: ICD-10-CM

## 2023-03-21 DIAGNOSIS — H93.19 TINNITUS, UNSPECIFIED EAR: ICD-10-CM

## 2023-03-21 DIAGNOSIS — E78.5 HYPERLIPIDEMIA, UNSPECIFIED: ICD-10-CM

## 2023-03-30 NOTE — DISCHARGE NOTE PROVIDER - NSDCCAREPROVSEEN_GEN_ALL_CORE_FT
Kadie, Penelope Borden, Abel Castanon, Jackie Domínguez, Lynn Modi, Shirlene Glez, Balwinder Saldana, Irina Turner Opioid Pregnancy And Lactation Text: These medications can lead to premature delivery and should be avoided during pregnancy. These medications are also present in breast milk in small amounts.

## 2023-12-14 NOTE — DISCHARGE NOTE PROVIDER - CARE PROVIDERS DIRECT ADDRESSES
Neck , no lymphadenopathy ,[email or web address]